# Patient Record
Sex: MALE | Race: WHITE | NOT HISPANIC OR LATINO | Employment: UNEMPLOYED | URBAN - NONMETROPOLITAN AREA
[De-identification: names, ages, dates, MRNs, and addresses within clinical notes are randomized per-mention and may not be internally consistent; named-entity substitution may affect disease eponyms.]

---

## 2017-02-09 ENCOUNTER — CLINICAL SUPPORT (OUTPATIENT)
Dept: AUDIOLOGY | Facility: CLINIC | Age: 47
End: 2017-02-09

## 2017-02-09 ENCOUNTER — OFFICE VISIT (OUTPATIENT)
Dept: OTOLARYNGOLOGY | Facility: CLINIC | Age: 47
End: 2017-02-09

## 2017-02-09 VITALS — TEMPERATURE: 98.9 F | HEIGHT: 60 IN | BODY MASS INDEX: 29.06 KG/M2 | WEIGHT: 148 LBS

## 2017-02-09 DIAGNOSIS — G51.0 PARTIAL FACIAL NERVE PALSY: ICD-10-CM

## 2017-02-09 DIAGNOSIS — H90.3 ASYMMETRIC SNHL (SENSORINEURAL HEARING LOSS): ICD-10-CM

## 2017-02-09 DIAGNOSIS — H90.3 SENSORINEURAL HEARING LOSS, ASYMMETRICAL: Primary | ICD-10-CM

## 2017-02-09 DIAGNOSIS — H83.2X1 BALANCE PROBLEM DUE TO VESTIBULAR DYSFUNCTION OF RIGHT EAR: Primary | ICD-10-CM

## 2017-02-09 PROCEDURE — 99243 OFF/OP CNSLTJ NEW/EST LOW 30: CPT | Performed by: OTOLARYNGOLOGY

## 2017-02-09 RX ORDER — CYCLOBENZAPRINE HCL 10 MG
10 TABLET ORAL 3 TIMES DAILY
COMMUNITY
Start: 2016-11-23

## 2017-02-09 RX ORDER — LEVOTHYROXINE SODIUM 0.03 MG/1
25 TABLET ORAL
COMMUNITY
Start: 2016-11-30 | End: 2017-11-26

## 2017-02-09 RX ORDER — MECLIZINE HYDROCHLORIDE 25 MG/1
25 TABLET ORAL 3 TIMES DAILY
COMMUNITY
Start: 2016-11-23

## 2017-02-09 RX ORDER — ATORVASTATIN CALCIUM 20 MG/1
20 TABLET, FILM COATED ORAL NIGHTLY
COMMUNITY
Start: 2016-10-10

## 2017-02-09 RX ORDER — TRAZODONE HYDROCHLORIDE 50 MG/1
TABLET ORAL
COMMUNITY
Start: 2016-10-24

## 2017-02-09 NOTE — PROGRESS NOTES
Subjective   Ubaldo Miranda is a 47 y.o. male.   This is a consultation from Dr. Vidales  History of Present Illness     Patient reports back in 2015 he was diagnosed with a right sided acoustic neuroma.  Was subsequently sent to [facility] for surgery and underwent a partial resection and apparently radiation afterwards which was completed in December 2015.  Was apparently having right sided facial weakness before the surgery and had residual weakness after the surgery.  Reportedly has had hearing loss all his life and has noticed hearing is decreased on the left, which is now his only hearing ear.  Has a hearing aid for that ear that is about 9 years old.  Has significant trouble with imbalance.  Has continuous tinnitus in his right ear following surgery.    The following portions of the patient's history were reviewed and updated as appropriate: allergies, current medications, past family history, past medical history, past social history, past surgical history and problem list.      Ubaldo Miranda reports that he has never smoked. He does not have any smokeless tobacco history on file. He reports that he does not drink alcohol.  Patient is not a tobacco user and has not been counseled for use of tobacco products    Family History   Problem Relation Age of Onset   • Cancer Father    • Heart disease Father          Current Outpatient Prescriptions:   •  atorvastatin (LIPITOR) 20 MG tablet, , Disp: , Rfl:   •  cyclobenzaprine (FLEXERIL) 10 MG tablet, Take 10 mg by mouth 3 (Three) Times a Day., Disp: , Rfl:   •  diltiazem XR (DILACOR XR) 120 MG 24 hr capsule, Take 120 mg by mouth Daily., Disp: , Rfl:   •  ergocalciferol (ERGOCALCIFEROL) 99345 UNITS capsule, Take 50,000 Units by mouth., Disp: , Rfl:   •  levothyroxine (SYNTHROID) 25 MCG tablet, Take 25 mcg by mouth., Disp: , Rfl:   •  lisinopril (PRINIVIL,ZESTRIL) 40 MG tablet, Take 40 mg by mouth Daily., Disp: , Rfl:   •  meclizine (ANTIVERT) 25 MG tablet, Take 25 mg  by mouth 3 (Three) Times a Day., Disp: , Rfl:   •  traZODone (DESYREL) 50 MG tablet, 1-2 tabs po qhs, Disp: , Rfl:   •  guaiFENesin (MUCINEX) 600 MG 12 hr tablet, Take 1,200 mg by mouth 2 (Two) Times a Day As Needed for cough., Disp: , Rfl:   •  levoFLOXacin (LEVAQUIN) 500 MG tablet, Take 500 mg by mouth Daily., Disp: , Rfl:       Past Medical History   Diagnosis Date   • Acute bronchitis    • Blepharitis      Anterior and Posterior      • Chalazion      RLL   • Cough    • Discharge from eye    • Essential hypertension      and nonnephrotic proteinuria      • Hearing loss    • Rosacea    • Short stature disorder    • Upper respiratory infection    • Vitamin D deficiency          Review of Systems   HENT: Positive for hearing loss.    Eyes: Positive for visual disturbance.   Neurological: Positive for headaches.   All other systems reviewed and are negative.          Objective   Physical Exam  General: Well-nourished male who is of short stature but otherwise well-developed.  Head normocephalic.  Has right-sided facial weakness.  Has intermittent periorbital spasm.  Speech shows inflection consistent with long-standing hearing loss as well as some misarticulation related to his facial paralysis.  Ears: External ears no deformity, canals no discharge, tympanic membranes intact clear and mobile bilaterally.  Nose: Nares show no discharge mass polyp or purulence.  Boggy mucosa is present.  No gross external deformity.  Septum:midline  Oral cavity: Lips and gums without lesions.  Tongue and floor of mouth without lesions.  Parotid and submandibular ducts unobstructed.  No mucosal lesions on the buccal mucosa or vestibule of the mouth.  Pharynx: No erythema exudate or mass  Neck: No lymphadenopathy.  No thyromegaly.  Trachea and larynx midline.  No masses in the parotid or submandibular glands.    Audiogram is obtained and reviewed and shows a dead ear on the right and a mild to severe sensorineural hearing loss on the  left.  Tympanograms are type A bilaterally.  Discrimination score of 60% on the left no response on the right      Assessment/Plan   Ubaldo was seen today for hearing loss.    Diagnoses and all orders for this visit:    Balance problem due to vestibular dysfunction of right ear  -     Ambulatory Referral to Sports Medicine    Asymmetric SNHL (sensorineural hearing loss)    Partial facial nerve palsy      Plan: Explained to the patient that his trouble with imbalance may be amenable to vestibular rehabilitation and as such I recommended a consultation with physical therapy for vestibular rehabilitation.  He should also use a walking stick when ambulating and make sure that his home as well lit so that he has visual input as well.  Explained that there is no amplification or other remedy for his hearing loss on the right but that his hearing loss on the left may be amenable to a stronger or new her hearing aid if he is able to get one.  He'll return after vestibular rehabilitation and proximally 2 months.    My thanks Dr. Vidales for this consultation.

## 2017-02-09 NOTE — PROGRESS NOTES
STANDARD AUDIOMETRIC EVALUATION      Name:  Ubaldo Miranda  :  1970  Age:  47 y.o.  Date of Evaluation:  2017      HISTORY    Reason for visit:  Ubaldo Miranda is seen today for a hearing evaluation at the request of Dr. Yazan Gomez.  Patient reports he had an acoustic neuroma in his right ear removed by surgery.  Consequently, he no longer has hearing in his right ear.  However, he hears ringing and buzzing in his right ear.  He has a history of Meniere's Disease.  He wears an ITC hearing aid in his left ear.       EVALUATION    See Audiogram    RESULTS    Otoscopic Evaluation:  Clear ear canals  bilaterally        Tympanometry:   Immittance Measures: 226 Hz        Right Ear: Middle ear function within normal limits        Left Ear: Middle ear function within normal limits    Test technique:  Standard Audiometry     Pure Tone Audiometry:   Patient responded to pure tones at 40-90 dB for 250-8000 Hz in right ear and at no response for 250-8000 Hz in right ear.      Speech Audiometry:        Right Ear:  no response to speech                 Speech Discrimination scores were not evaluated due to limits of audiometer        Left Ear:  Speech Reception Threshold (SRT) was obtained at 55 dBHL                 Speech Discrimination scores were 60% in quiet when words were presented at 85 dBHL    Reliability:   good    IMPRESSIONS:  1.  Tympanometry results are consistent with Middle ear function within normal limits in both ears   2.  Pure tone results are consistent with profound flat sensorineural hearing loss  for right ear, and mild to severe sloping sensorineural hearing loss  for left ear.        RECOMMENDATIONS:  Patient is seeing the Ear Nose and Throat physician immediately following this examination.  It was a pleasure seeing Ubaldo Miranda in Audiology today.  We would be happy to do further testing or discuss these test as necessary.          This document has been electronically signed by  Marie Rdz, MS MCCRACKEN-A on February 9, 2017 4:57 PM       Marie Rdz MS CCC-A  Licensed Audiologist

## 2017-02-20 ENCOUNTER — HOSPITAL ENCOUNTER (OUTPATIENT)
Dept: PHYSICAL THERAPY | Facility: HOSPITAL | Age: 47
Setting detail: THERAPIES SERIES
Discharge: HOME | End: 2017-02-20

## 2017-02-20 DIAGNOSIS — R26.89 BALANCE DISORDER: ICD-10-CM

## 2017-02-20 DIAGNOSIS — H83.2X1 LABYRINTHINE DYSFUNCTION OF RIGHT EAR: Primary | ICD-10-CM

## 2017-02-20 PROCEDURE — 97162 PT EVAL MOD COMPLEX 30 MIN: CPT | Performed by: PHYSICAL THERAPIST

## 2017-02-20 NOTE — PROGRESS NOTES
Outpatient Physical Therapy Vestibular Initial Evaluation  [facility]     Patient Name: Ubaldo Miranda  : 1970  MRN: [id number]  Today's Date: 2017      Visit Date: 2017  Attendance:  (authorization required)  Subjective Improvement: N/A  Next MD Appt: 17  Recert Date: 3/13/17    Therapy Diagnosis: Vestibular and balance dysfunction; s/p R acoustic neuroma resection 8-12-15    There is no problem list on file for this patient.       Past Medical History   Diagnosis Date   • Acute bronchitis    • Blepharitis      Anterior and Posterior      • Chalazion      RLL   • Cough    • Discharge from eye    • Essential hypertension      and nonnephrotic proteinuria      • Hearing loss    • Rosacea    • Short stature disorder    • Upper respiratory infection    • Vitamin D deficiency         Past Surgical History   Procedure Laterality Date   • Brain surgery       acoustif neuroma partially removed      Patient's medical and surgical history and medications were reviewed with the patient. I requested patient bring an updated medication list.    Visit Dx:     ICD-10-CM ICD-9-CM   1. Labyrinthine dysfunction of right ear H83.2X1 386.50   2. Balance disorder R26.89 781.99             Patient History       17 1100          History    Chief Complaint Balance Problems  -SS      Date Current Problem(s) Began --   > 1.5 years  -SS      Brief Description of Current Complaint Balance issues/unsteadiness worse after a R acoustic neuroma removal on 2015. He has experienced resultant ringing of the right ear and sensation of the room moving around since the surgery. He also experiences vertigo with position changes. He did not have physical therapy after surgery. Symptoms occur daily and are not worsening or improving. He has no falls since surgery, but he and his mother are very cautious about him not falling. Dr. Gomez recommended a cane, but he has not gotten one yet. Pt is a single male  without children. He lives with his mother in a single story house with 1 step to enter and no steps inside. His hobbies include aquarium and computer games. He has difficulty with stairs due to fatigue and imbalance.  -SS      Previous treatment for THIS PROBLEM Medication   antivert  -SS      Patient/Caregiver Goals Relief from dizziness  -SS      Current Tobacco Use none  -SS      Occupation/sports/leisure activities unemployed. Hobbies include aquarium and computer games  -SS      Patient seeing anyone else for problem(s)? No  -SS      How has patient tried to help current problem? medical management only  -SS      Fall Risk Assessment    Any falls in the past year: No  -SS      Does patient have a fear of falling Other (comment)   not stated fear, but cautious  -SS      Daily Activities    Primary Language English  -      Safety    Are you being hurt, hit, or frightened by anyone at home or in your life? No  -SS      Are you being neglected by a caregiver No  -SS        User Key  (r) = Recorded By, (t) = Taken By, (c) = Cosigned By    Initials Name Provider Type     Kenny Rdz, PT Physical Therapist                Vestibular Eval       02/20/17 1200          Vestibular Objective    General Observation Stands with somewhat narrow NBOS that widens with gait.  -SS      Fall History none  -SS      Use of Assistive Devices none  -SS      Symptom Behavior    Type of Dizziness Imbalance;World moves;Funny feeling in head  -SS      Frequency of Dizziness Several Times a Day  -SS      Duration of Dizziness Other   seconds with position changes  -SS      Aggravating Factors --   position changes for dizziness; room moves w/o aggravation  -SS      Relieving Factors No known relieving factors  -SS      Occulomotor Exam Fixation Present    Occular ROM Normal  -SS      Spontaneous Nystagmus Absent  -SS      Gaze-induced Nystagmus Absent  -SS      Smooth Pursuit Normal  -SS      Static    Static  Position;Surface;Duration;Standing Static Balance Comments  -SS      Position Feet together (Rhomberg)  -SS      Surface Hard  -SS      Duration 3 seconds eyes closed  -SS      Standing Static Balance Comments tandem L & R lead needs assist to prevent fall  -SS      Dynamic    Position Feet apart tandem right forward;Feet apart tandem left forward;Single leg right;Single leg left   SLS unable to perform B secondary to loss of balance  -SS        User Key  (r) = Recorded By, (t) = Taken By, (c) = Cosigned By    Initials Name Provider Type    BLANCA Rdz, LORNA Physical Therapist                  PT Neuro       02/20/17 1200          Subjective Comments    Subjective Comments see Therapy Patient History  -SS      Precautions and Contraindications    Precautions/Limitations fall precautions   dizziness.  -SS      Subjective Pain    Able to rate subjective pain? yes  -SS      Pre-Treatment Pain Level 0  -SS      Post-Treatment Pain Level 0  -SS      Home Living    Living Arrangements house  -SS      Home Accessibility no concerns  -SS      Stair Railings at Home none  -SS      Living Environment Comment Lives with mother. 1 step to enter  -SS      Cognition    Overall Cognitive Status WFL  -SS        User Key  (r) = Recorded By, (t) = Taken By, (c) = Cosigned By    Initials Name Provider Type    BLANCA Rdz, LORNA Physical Therapist                      Therapy Education       02/20/17 1400    Therapy Education    Given Other (comment)   Overview of planned therapy  -SS    Program New  -SS    How Provided Verbal  -SS    Provided to Patient;Caregiver  -SS    Level of Understanding Verbalized  -SS      User Key  (r) = Recorded By, (t) = Taken By, (c) = Cosigned By    Initials Name Provider Type    BLANCA Rdz PT Physical Therapist                  Exercises       02/20/17 1200          Subjective Comments    Subjective Comments see Therapy Patient History  -SS      Subjective Pain    Able  to rate subjective pain? yes  -SS      Pre-Treatment Pain Level 0  -SS      Post-Treatment Pain Level 0  -SS        User Key  (r) = Recorded By, (t) = Taken By, (c) = Cosigned By    Initials Name Provider Type     Kenny Rdz, PT Physical Therapist                            PT OP Goals       02/20/17 1444 02/20/17 1400       PT Short Term Goals    STG Date to Achieve  03/13/17  -     STG 1  Demonstate B SLS w/ eyes open of >= 10 seconds  -SS     STG 2  Demonstrate tandem stance, B lead, of >= 5 seconds each  -SS     STG 3  Rhomberg stance with eyes closed of >= 20 seconds w/o sensation of falling over  -SS     Long Term Goals    LTG Date to Achieve  04/03/17  -     LTG 1  Independent w/ HEP  -SS     LTG 2  Ambulate community distances in clinic including obstacles with minimal losses of balance.  -     LTG 3  Minimal sensation of dizziness during movement or position changes  -     LTG 4  Ascend and descend 1 flight of stairs with minimal fatigue and loss of balance  -     Time Calculation    PT Goal Re-Cert Due Date 03/13/17  -SS 03/13/17  -       User Key  (r) = Recorded By, (t) = Taken By, (c) = Cosigned By    Initials Name Provider Type     Kenny Rdz, PT Physical Therapist                PT Assessment/Plan       02/20/17 1400          PT Assessment    Functional Limitations Impaired gait;Decreased safety during functional activities  -      Impairments Balance;Endurance  -      Assessment Comments Chronic balance issues complicated by resection of acoutic neuroma on R. Needs habituation and compensation training. Deaf R ear and hard of hearing in L  -SS      Rehab Potential Fair   Barrier: chronicity and ablation of R acoustic nerve  -      Patient/caregiver participated in establishment of treatment plan and goals Yes  -SS      Patient would benefit from skilled therapy intervention Yes  -SS      PT Plan    PT Frequency 2x/week  -SS      Predicted Duration of Therapy  Intervention (days/wks) 6 weeks  -      Planned CPT's? PT EVAL: 42557;PT RE-EVAL: 07109;PT THER PROC EA 15 MIN: 43148;PT NEUROMUSC RE-EDUCATION EA 15 MIN: 11334  -      Physical Therapy Interventions (Optional Details) vestibular training  -      PT Plan Comments Habituation and compensation strategies  -        User Key  (r) = Recorded By, (t) = Taken By, (c) = Cosigned By    Initials Name Provider Type     Kenny Rdz, PT Physical Therapist                 Time Calculation:   Start Time: 1004  Stop Time: 1038  Time Calculation (min): 34 min     Therapy Charges for Today     Code Description Service Date Service Provider Modifiers Qty    03145432380 HC PT EVAL MOD COMPLEXITY 2 2/20/2017 Kenny Rdz, PT GP 1                    Kenny Rdz, PT  2/20/2017

## 2017-02-23 ENCOUNTER — TRANSCRIBE ORDERS (OUTPATIENT)
Dept: LAB | Facility: HOSPITAL | Age: 47
End: 2017-02-23

## 2017-02-23 DIAGNOSIS — R80.9 PROTEINURIA: Primary | ICD-10-CM

## 2017-02-28 ENCOUNTER — HOSPITAL ENCOUNTER (OUTPATIENT)
Dept: PHYSICAL THERAPY | Facility: HOSPITAL | Age: 47
Setting detail: THERAPIES SERIES
Discharge: HOME | End: 2017-02-28

## 2017-02-28 DIAGNOSIS — H83.2X1 LABYRINTHINE DYSFUNCTION OF RIGHT EAR: Primary | ICD-10-CM

## 2017-02-28 DIAGNOSIS — R26.89 BALANCE DISORDER: ICD-10-CM

## 2017-02-28 PROCEDURE — 97110 THERAPEUTIC EXERCISES: CPT

## 2017-03-01 ENCOUNTER — HOSPITAL ENCOUNTER (OUTPATIENT)
Dept: PHYSICAL THERAPY | Facility: HOSPITAL | Age: 47
Setting detail: THERAPIES SERIES
Discharge: HOME | End: 2017-03-01

## 2017-03-01 DIAGNOSIS — H83.2X1 LABYRINTHINE DYSFUNCTION OF RIGHT EAR: Primary | ICD-10-CM

## 2017-03-01 DIAGNOSIS — R26.89 BALANCE DISORDER: ICD-10-CM

## 2017-03-01 PROCEDURE — 97110 THERAPEUTIC EXERCISES: CPT

## 2017-03-01 NOTE — PROGRESS NOTES
Outpatient Physical Therapy Ortho Treatment Note  [facility]     Patient Name: Ubaldo Miranda  : 1970  MRN: [id number]  Today's Date: 3/1/2017      Visit Date: 2017   Subjective Improvement: 0%  MD visit: 17  Visit Number: 3/3  Total Approved:7 visits approved (24 units)  Recert Date: 3/13/17    Refer to paper chart for [facility] protocol.        Visit Dx:    ICD-10-CM ICD-9-CM   1. Labyrinthine dysfunction of right ear H83.2X1 386.50   2. Balance disorder R26.89 781.99       There is no problem list on file for this patient.       Past Medical History   Diagnosis Date   • Acute bronchitis    • Blepharitis      Anterior and Posterior      • Chalazion      RLL   • Cough    • Discharge from eye    • Essential hypertension      and nonnephrotic proteinuria      • Hearing loss    • Rosacea    • Short stature disorder    • Upper respiratory infection    • Vitamin D deficiency         Past Surgical History   Procedure Laterality Date   • Brain surgery       acoustif neuroma partially removed              PT Ortho       17 1014    Precautions and Contraindications    Contraindications Deaf R Ear  -KH    Posture/Observations    Posture/Observations Comments Hard Hearing/Deaf R Ear   -KH      User Key  (r) = Recorded By, (t) = Taken By, (c) = Cosigned By    Initials Name Provider Type    GABRIEL Zee PTA Physical Therapy Assistant                PT Neuro       17 1014          Subjective Comments    Subjective Comments Pt reports that he had some nasuea with the exercises yesterday but not to bad.  DOing well with exercises at home.  Still feels like his balance is abou the same.  Still gets dizzy bending over to tie and put on his shoes.   -GABRIEL      Subjective Pain    Able to rate subjective pain? yes  -GABRIEL      Pre-Treatment Pain Level 0  -KH      Post-Treatment Pain Level 0  -KH        User Key  (r) = Recorded By, (t) = Taken By, (c) = Cosigned By    Initials Name Provider  "Type    GABRIEL Zee PTA Physical Therapy Assistant                        PT Assessment/Plan       03/01/17 1014       PT Assessment    Assessment Comments Completed section 2 with minimal dizziness or nausea.  Did well with standing balance.  Starts to sway after 15 seconds but no dizziness  -     PT Plan    PT Frequency 2x/week  -GABRIEL     Predicted Duration of Therapy Intervention (days/wks) 6 weeks  -     PT Plan Comments Progress [place] exerciese as able.  -       User Key  (r) = Recorded By, (t) = Taken By, (c) = Cosigned By    Initials Name Provider Type    GABRIEL Zee PTA Physical Therapy Assistant                    Exercises       03/01/17 1014          Subjective Comments    Subjective Comments Pt reports that he had some nasuea with the exercises yesterday but not to bad.  DOing well with exercises at home.  Still feels like his balance is abou the same.  Still gets dizzy bending over to tie and put on his shoes.   -      Subjective Pain    Able to rate subjective pain? yes  -      Pre-Treatment Pain Level 0  -      Post-Treatment Pain Level 0  -      Exercise 1    Exercise Name 1 [place] section B sitting  -      Sets 1 1  -KH      Reps 1 10  -KH      Exercise 2    Exercise Name 2 [place] Section 3 with head movements up,down,turn sideways  -      Exercise 3    Exercise Name 3 seated cone taps with head rotation at 3 levels  -      Sets 3 1  -KH      Reps 3 10  -KH      Exercise 4    Exercise Name 4 seated cone lift up reach and take down  -      Sets 4 2  -KH      Reps 4 5  -KH      Exercise 5    Exercise Name 5 standing NBOS EO  -KH      Sets 5 2  -KH      Time (Seconds) 5 30\" holds  -      Exercise 6    Exercise Name 6 seated cone pass under legs and grab while leaning fwd  -      Sets 6 2  -KH      Reps 6 5  -KH        User Key  (r) = Recorded By, (t) = Taken By, (c) = Cosigned By    Initials Name Provider Type    GABRIEL Zee PTA Physical Therapy " Assistant                               PT OP Goals       03/01/17 1014       PT Short Term Goals    STG Date to Achieve 03/13/17  -     STG 1 Demonstate B SLS w/ eyes open of >= 10 seconds  -     STG 1 Progress Not Met  -     STG 2 Demonstrate tandem stance, B lead, of >= 5 seconds each  -     STG 2 Progress Not Met  -     STG 3 Rhomberg stance with eyes closed of >= 20 seconds w/o sensation of falling over  -     STG 3 Progress Not Met  -     Long Term Goals    LTG Date to Achieve 04/03/17  -     LTG 1 Independent w/ HEP  -     LTG 1 Progress Ongoing  -     LTG 2 Ambulate community distances in clinic including obstacles with minimal losses of balance.  -     LTG 2 Progress Not Met  -     LTG 3 Minimal sensation of dizziness during movement or position changes  -     LTG 3 Progress Not Met  -     LTG 4 Ascend and descend 1 flight of stairs with minimal fatigue and loss of balance  -     LTG 4 Progress Not Met  -     Time Calculation    PT Goal Re-Cert Due Date 03/13/17  -       User Key  (r) = Recorded By, (t) = Taken By, (c) = Cosigned By    Initials Name Provider Type    GABRIEL Zee PTA Physical Therapy Assistant                Therapy Education       03/01/17 1014          Therapy Education    Given HEP   section 3 standing with head movements  -      Program New  -      How Provided Verbal;Written  -      Level of Understanding Teach back education performed;Demonstrated  -        User Key  (r) = Recorded By, (t) = Taken By, (c) = Cosigned By    Initials Name Provider Type    GABRIEL Zee PTA Physical Therapy Assistant                Time Calculation:   Start Time: 1014  Stop Time: 1054  Time Calculation (min): 40 min  Total Timed Code Minutes- PT: 40 minute(s)    Therapy Charges for Today     Code Description Service Date Service Provider Modifiers Qty    65317039779 HC PT THER PROC EA 15 MIN 3/1/2017 Lizz Zee PTA GP 3                    Lizz Zee  PTA  3/1/2017

## 2017-03-07 ENCOUNTER — HOSPITAL ENCOUNTER (OUTPATIENT)
Dept: PHYSICAL THERAPY | Facility: HOSPITAL | Age: 47
Setting detail: THERAPIES SERIES
Discharge: HOME | End: 2017-03-07

## 2017-03-07 DIAGNOSIS — R26.89 BALANCE DISORDER: ICD-10-CM

## 2017-03-07 DIAGNOSIS — H83.2X1 LABYRINTHINE DYSFUNCTION OF RIGHT EAR: Primary | ICD-10-CM

## 2017-03-07 PROCEDURE — 97110 THERAPEUTIC EXERCISES: CPT

## 2017-03-07 NOTE — PROGRESS NOTES
Outpatient Physical Therapy Ortho Treatment Note  [facility]     Patient Name: Ubaldo Miranda  : 1970  MRN: [id number]  Today's Date: 3/7/2017      Visit Date: 2017     Subjective Improvement 0%  Visits 4/4  Visits Approved 7 visits, 24 units or until 3-  RTMD 2017  Recert Date 3-    Visit Dx:    ICD-10-CM ICD-9-CM   1. Labyrinthine dysfunction of right ear H83.2X1 386.50   2. Balance disorder R26.89 781.99       There is no problem list on file for this patient.       Past Medical History   Diagnosis Date   • Acute bronchitis    • Blepharitis      Anterior and Posterior      • Chalazion      RLL   • Cough    • Discharge from eye    • Essential hypertension      and nonnephrotic proteinuria      • Hearing loss    • Rosacea    • Short stature disorder    • Upper respiratory infection    • Vitamin D deficiency         Past Surgical History   Procedure Laterality Date   • Brain surgery       acoustif neuroma partially removed              PT Ortho       17 1500    Precautions and Contraindications    Precautions/Limitations fall precautions  -CP    Contraindications Deaf Right Ear  -CP    Subjective Pain    Able to rate subjective pain? yes  -CP    Pre-Treatment Pain Level 0  -CP    Post-Treatment Pain Level 0  -CP      User Key  (r) = Recorded By, (t) = Taken By, (c) = Cosigned By    Initials Name Provider Type    CP Jaqui Moran PTA Physical Therapy Assistant                            PT Assessment/Plan       17 1518       PT Assessment    Assessment Comments Increased dizziness was reported with up and down ramp in rehab gym and in seated position bending at waist to  an object  -CP     PT Plan    PT Frequency 2x/week  -CP     Predicted Duration of Therapy Intervention (days/wks) 4 weeks  -CP     PT Plan Comments cont with POC. Progressing patient as tolerated  -CP       User Key  (r) = Recorded By, (t) = Taken By, (c) = Cosigned By    Initials Name  Provider Type    CP Jaqui Moran PTA Physical Therapy Assistant                    Exercises       03/07/17 1500          Subjective Comments    Subjective Comments Patient reports doing exercises at home.  Patient states that dizziness increases with positional changes  -CP      Subjective Pain    Able to rate subjective pain? yes  -CP      Pre-Treatment Pain Level 0  -CP      Post-Treatment Pain Level 0  -CP      Exercise 1    Exercise Name 1 CawthorneCooksey  Group A  -CP      Cueing 1 --   very slight dizziness reported  -CP      Sets 1 1  -CP      Reps 1 10  -CP      Exercise 2    Exercise Name 2 CawthorneCooksey Groups #4  -CP      Cueing 2 --   increase dizziness reported  -CP      Sets 2 1  -CP      Reps 2 10  -CP      Exercise 3    Exercise Name 3 cawthornecooksey Group C 2,, 3. 5  -CP      Cueing 3 --   dizziness with #5  -CP      Sets 3 1  -CP      Reps 3 10  -CP      Exercise 4    Exercise Name 4 CawthorneCooksey Group D #2   increase dizziness reported  -CP      Cueing 4 --   in // bars with gait belt  -CP      Reps 4 5  -CP      Exercise 5    Exercise Name 5 retro gait in // bars with gait belt  -CP      Reps 5 5  -CP      Exercise 6    Exercise Name 6 up ramp with eyes closed down ramp with eyes open with giat belt and stand by assist  -CP      Cueing 6 Tactile  -CP      Reps 6 5  -CP        User Key  (r) = Recorded By, (t) = Taken By, (c) = Cosigned By    Initials Name Provider Type    CP Jaqui Moran PTA Physical Therapy Assistant                               PT OP Goals       03/07/17 1500       PT Short Term Goals    STG Date to Achieve 03/13/17  -CP     STG 1 Demonstate B SLS w/ eyes open of >= 10 seconds  -CP     STG 1 Progress Not Met  -CP     STG 2 Demonstrate tandem stance, B lead, of >= 5 seconds each  -CP     STG 2 Progress Not Met  -CP     STG 3 Rhomberg stance with eyes closed of >= 20 seconds w/o sensation of falling over  -CP     STG 3 Progress Not Met  -CP     Long Term  Goals    LTG Date to Achieve 04/03/17  -CP     LTG 1 Independent w/ HEP  -CP     LTG 1 Progress Ongoing  -CP     LTG 2 Ambulate community distances in clinic including obstacles with minimal losses of balance.  -CP     LTG 2 Progress Not Met  -CP     LTG 3 Minimal sensation of dizziness during movement or position changes  -CP     LTG 3 Progress Not Met  -CP     LTG 4 Ascend and descend 1 flight of stairs with minimal fatigue and loss of balance  -CP     LTG 4 Progress Not Met  -CP       User Key  (r) = Recorded By, (t) = Taken By, (c) = Cosigned By    Initials Name Provider Type    CP Jaqui Moran PTA Physical Therapy Assistant                Therapy Education       03/07/17 1517          Therapy Education    Given HEP   no change to HEP at this time  -CP      Program Reinforced  -CP      How Provided Verbal;Demonstration  -CP      Provided to Patient;Caregiver  -CP      Level of Understanding Verbalized;Demonstrated  -CP        User Key  (r) = Recorded By, (t) = Taken By, (c) = Cosigned By    Initials Name Provider Type    CP Jaqui Moran PTA Physical Therapy Assistant                Time Calculation:   Start Time: 1300  Stop Time: 1345  Time Calculation (min): 45 min  Total Timed Code Minutes- PT: 45 minute(s)    Therapy Charges for Today     Code Description Service Date Service Provider Modifiers Qty    04878228387 HC PT THER PROC EA 15 MIN 3/7/2017 Jaqui Moran PTA GP 3                    Jaqui Moran PTA  3/7/2017

## 2017-03-09 ENCOUNTER — HOSPITAL ENCOUNTER (OUTPATIENT)
Dept: PHYSICAL THERAPY | Facility: HOSPITAL | Age: 47
Setting detail: THERAPIES SERIES
Discharge: HOME | End: 2017-03-09

## 2017-03-09 DIAGNOSIS — R26.89 BALANCE DISORDER: ICD-10-CM

## 2017-03-09 DIAGNOSIS — H83.2X1 LABYRINTHINE DYSFUNCTION OF RIGHT EAR: Primary | ICD-10-CM

## 2017-03-09 PROCEDURE — 97110 THERAPEUTIC EXERCISES: CPT

## 2017-03-09 NOTE — PROGRESS NOTES
Outpatient Physical Therapy Ortho Treatment Note  [facility]     Patient Name: Ubaldo Miranda  : 1970  MRN: [id number]  Today's Date: 3/9/2017      Visit Date: 2017   Subjective Improvement: 0%  Visit Number:     Recert Date:   3/13/17  MD Visit:   4/3/17  Total Approved Visits:  Eval plus 6 until 3/24/17 (24 units)    Visit Dx:    ICD-10-CM ICD-9-CM   1. Labyrinthine dysfunction of right ear H83.2X1 386.50   2. Balance disorder R26.89 781.99       There is no problem list on file for this patient.       Past Medical History   Diagnosis Date   • Acute bronchitis    • Blepharitis      Anterior and Posterior      • Chalazion      RLL   • Cough    • Discharge from eye    • Essential hypertension      and nonnephrotic proteinuria      • Hearing loss    • Rosacea    • Short stature disorder    • Upper respiratory infection    • Vitamin D deficiency         Past Surgical History   Procedure Laterality Date   • Brain surgery       acoustif neuroma partially removed              PT Ortho       17 1300    Precautions and Contraindications    Precautions/Limitations fall precautions   used gait belt  -BB    Contraindications Deaf Right Ear  -BB    Gait Assessment/Treatment    Gait, Comment No AD. Unsteady gait at times.    Lat sway with gait.  -BB      17 1500    Precautions and Contraindications    Precautions/Limitations fall precautions  -CP    Contraindications Deaf Right Ear  -CP    Subjective Pain    Able to rate subjective pain? yes  -CP    Pre-Treatment Pain Level 0  -CP    Post-Treatment Pain Level 0  -CP      User Key  (r) = Recorded By, (t) = Taken By, (c) = Cosigned By    Initials Name Provider Type    BB Mary Bell PTA Physical Therapy Assistant    YADI Moran PTA Physical Therapy Assistant                            PT Assessment/Plan       17 1344       PT Assessment    Assessment Comments Good effort. No change to current HEP.  Pt still risk for falls  due to dizziness.  -BB     PT Plan    PT Frequency 2x/week  -BB     Predicted Duration of Therapy Intervention (days/wks) x 4 weeks  -BB     PT Plan Comments Continue with standing balance and habituation.  Recheck next Wednesday with primary PT.  -BB       User Key  (r) = Recorded By, (t) = Taken By, (c) = Cosigned By    Initials Name Provider Type    CARLA Bell PTA Physical Therapy Assistant                    Exercises       03/09/17 1300          Subjective Comments    Subjective Comments Feeling about the same. Had dizziness this morning when bent over to tie shoes.  -BB      Subjective Pain    Able to rate subjective pain? yes  -BB      Pre-Treatment Pain Level 0   no dizziness at beginning of treatment.  -BB      Post-Treatment Pain Level 4   dizziness not pain  -BB      Exercise 1    Exercise Name 1 Cawthorne/Cooksey Group A #2  A and B  -BB      Time (Seconds) 1 --   A 43 sec  B 40 sec  -BB      Exercise 2    Exercise Name 2 CawthorneCooksey Groups A #1 A  and B  -BB      Time (Seconds) 2 --   A 47 sec   B 32 Sec  -BB      Exercise 3    Exercise Name 3 Romberg SBA  -BB      Time (Minutes) 3 1  -BB      Exercise 4    Exercise Name 4 Split stance B CGA   performed Left in front first.  Needed rest in between.   -BB      Time (Seconds) 4 30   B  -BB      Exercise 5    Exercise Name 5 Feet apart EC    CGA/Min A at times.  -BB      Reps 5 2  -BB      Time (Seconds) 5 30  -BB      Exercise 6    Exercise Name 6 Gait 360 ft with cues    gait belt and 1 standing break to regroup  -BB      Cueing 6 Verbal  -BB      Exercise 7    Exercise Name 7 Standing Head turns   lateral  -BB      Time (Seconds) 7 30  -BB      Exercise 8    Exercise Name 8 Seated Eyes Closed head turns  -BB      Time (Seconds) 8 35 Sec   Shaky at end of exercise  -BB        User Key  (r) = Recorded By, (t) = Taken By, (c) = Cosigned By    Initials Name Provider Type    CARLA Bell PTA Physical Therapy Assistant                                PT OP Goals       03/09/17 1300       PT Short Term Goals    STG Date to Achieve 03/13/17  -BB     STG 1 Demonstate B SLS w/ eyes open of >= 10 seconds  -BB     STG 1 Progress Not Met  -BB     STG 2 Demonstrate tandem stance, B lead, of >= 5 seconds each  -BB     STG 2 Progress Not Met  -BB     STG 3 Rhomberg stance with eyes closed of >= 20 seconds w/o sensation of falling over  -BB     STG 3 Progress Not Met  -BB     Long Term Goals    LTG Date to Achieve 04/03/17  -BB     LTG 1 Independent w/ HEP  -BB     LTG 1 Progress Ongoing  -BB     LTG 2 Ambulate community distances in clinic including obstacles with minimal losses of balance.  -BB     LTG 2 Progress Not Met  -BB     LTG 3 Minimal sensation of dizziness during movement or position changes  -BB     LTG 3 Progress Not Met  -BB     LTG 4 Ascend and descend 1 flight of stairs with minimal fatigue and loss of balance  -BB     LTG 4 Progress Not Met  -BB     Time Calculation    PT Goal Re-Cert Due Date 03/13/17  -BB       User Key  (r) = Recorded By, (t) = Taken By, (c) = Cosigned By    Initials Name Provider Type    CARLA Bell PTA Physical Therapy Assistant                Therapy Education       03/09/17 1343          Therapy Education    Given HEP  -BB      Program Reinforced   no change to HEP  -BB      How Provided Verbal;Demonstration  -BB      Provided to Patient;Caregiver  -BB      Level of Understanding Verbalized;Demonstrated  -BB        User Key  (r) = Recorded By, (t) = Taken By, (c) = Cosigned By    Initials Name Provider Type    CARLA Bell PTA Physical Therapy Assistant                Time Calculation:   Start Time: 1304  Stop Time: 1345  Time Calculation (min): 41 min  Total Timed Code Minutes- PT: 41 minute(s)    Therapy Charges for Today     Code Description Service Date Service Provider Modifiers Qty    38496144934 HC PT THER PROC EA 15 MIN 3/9/2017 Mary Bell PTA GP 3                    Mary Bell  PTA  3/9/2017

## 2017-03-13 ENCOUNTER — HOSPITAL ENCOUNTER (OUTPATIENT)
Dept: PHYSICAL THERAPY | Facility: HOSPITAL | Age: 47
Setting detail: THERAPIES SERIES
Discharge: HOME | End: 2017-03-13

## 2017-03-13 DIAGNOSIS — H83.2X1 VESTIBULAR DYSFUNCTION, RIGHT: Primary | ICD-10-CM

## 2017-03-13 PROCEDURE — 97110 THERAPEUTIC EXERCISES: CPT | Performed by: PHYSICAL THERAPIST

## 2017-03-13 PROCEDURE — 97530 THERAPEUTIC ACTIVITIES: CPT | Performed by: PHYSICAL THERAPIST

## 2017-03-13 PROCEDURE — 97164 PT RE-EVAL EST PLAN CARE: CPT | Performed by: PHYSICAL THERAPIST

## 2017-03-13 NOTE — THERAPY DISCHARGE NOTE
Outpatient Physical Therapy Ortho Discharge  [facility]     Patient Name: Ubaldo Miranda  : 1970  MRN: [id number]  Today's Date: 3/13/2017      Visit Date: 2017     Visit 6 of 6  0% improvement                There is no problem list on file for this patient.       Past Medical History   Diagnosis Date   • Acute bronchitis    • Blepharitis      Anterior and Posterior      • Chalazion      RLL   • Cough    • Discharge from eye    • Essential hypertension      and nonnephrotic proteinuria      • Hearing loss    • Rosacea    • Short stature disorder    • Upper respiratory infection    • Vitamin D deficiency         Past Surgical History   Procedure Laterality Date   • Brain surgery       acoustif neuroma partially removed          Visit Dx:     ICD-10-CM ICD-9-CM   1. Vestibular dysfunction, right H83.2X1 386.50                                  Therapy Education       17 1355          Therapy Education    Given HEP;Fall prevention and home safety  -KW        User Key  (r) = Recorded By, (t) = Taken By, (c) = Cosigned By    Initials Name Provider Type    JASPER Roque, PT Physical Therapist                PT OP Goals       17 1300       PT Short Term Goals    STG Date to Achieve 17  -KW     STG 1 Demonstate B SLS w/ eyes open of >= 10 seconds  -KW     STG 1 Progress Not Met  -KW     STG 2 Demonstrate tandem stance, B lead, of >= 5 seconds each  -KW     STG 2 Progress Not Met  -KW     STG 3 Rhomberg stance with eyes closed of >= 20 seconds w/o sensation of falling over  -KW     STG 3 Progress Not Met  -KW     Long Term Goals    LTG Date to Achieve 17  -KW     LTG 1 Independent w/ HEP  -KW     LTG 1 Progress Ongoing  -KW     LTG 2 Ambulate community distances in clinic including obstacles with minimal losses of balance.  -KW     LTG 2 Progress Not Met  -KW     LTG 3 Minimal sensation of dizziness during movement or position changes  -KW     LTG 3 Progress Not Met  -KW      LTG 4 Ascend and descend 1 flight of stairs with minimal fatigue and loss of balance  -KW     LTG 4 Progress Not Met  -KW     Time Calculation    PT Goal Re-Cert Due Date 04/03/17  -KW       User Key  (r) = Recorded By, (t) = Taken By, (c) = Cosigned By    Initials Name Provider Type    JASPER Roque, PT Physical Therapist                PT Assessment/Plan       03/13/17 1356       PT Assessment    Assessment Comments no change in symptoms since start of PT. Good effort.  -KW     Please refer to paper survey for additional self-reported information No  -KW     Rehab Potential Fair  -KW     Patient/caregiver participated in establishment of treatment plan and goals Yes  -KW     PT Plan    PT Plan Comments Today last vist. D/C from PT  -KW       User Key  (r) = Recorded By, (t) = Taken By, (c) = Cosigned By    Initials Name Provider Type    JASPER Roque, PT Physical Therapist                Exercises       03/13/17 1300          Subjective Comments    Subjective Comments feeling no change since start of PT  -KW      Exercise 1    Exercise Name 1 Cawthorne/Cooksey Group A #2  A and B  -KW      Cueing 1 --   very slight dizziness reported  -KW      Sets 1 1  -KW      Reps 1 10  -KW      Time (Seconds) 1 --   A 43 sec  B 40 sec  -KW      Exercise 2    Exercise Name 2 CawthorneCooksey Groups A #1 A  and B  -KW      Cueing 2 --   increase dizziness reported  -KW      Sets 2 1  -KW      Reps 2 10  -KW      Time (Seconds) 2 --   A 47 sec   B 32 Sec  -KW      Exercise 3    Exercise Name 3 Romberg SBA  -KW      Cueing 3 --   dizziness with #5  -KW      Sets 3 1  -KW      Reps 3 10  -KW      Time (Minutes) 3 1  -KW      Exercise 4    Exercise Name 4 Split stance B CGA   performed Left in front first.  Needed rest in between.   -KW      Cueing 4 --   in // bars with gait belt  -KW      Sets 4 2  -KW      Reps 4 5  -KW      Time (Seconds) 4 30   B  -KW      Exercise 5    Exercise Name 5 Feet apart EC    CGA/Min A  at times.  -KW      Sets 5 2  -KW      Reps 5 2  -KW      Time (Seconds) 5 30  -KW      Exercise 6    Exercise Name 6 Gait 360 ft with cues    gait belt and 1 standing break to regroup  -KW      Cueing 6 Verbal  -KW      Sets 6 2  -KW      Reps 6 5  -KW      Exercise 7    Exercise Name 7 Standing Head turns   lateral  -KW      Time (Seconds) 7 30  -KW      Exercise 8    Exercise Name 8 Seated Eyes Closed head turns  -KW      Time (Seconds) 8 35 Sec   Shaky at end of exercise  -KW        User Key  (r) = Recorded By, (t) = Taken By, (c) = Cosigned By    Initials Name Provider Type    KW Gege Roque, PT Physical Therapist                             Time Calculation:   Start Time: 1300  Stop Time: 1350  Time Calculation (min): 50 min  Total Timed Code Minutes- PT: 50 minute(s)    Therapy Charges for Today     Code Description Service Date Service Provider Modifiers Qty    22731261911  PT RE-EVAL ESTABLISHED PLAN 2 3/13/2017 Gege Roque, PT GP 1    [id number]  PT THERAPEUTIC ACT EA 15 MIN 3/13/2017 Gege Roque, PT GP 2    96513254453 HC PT THER PROC EA 15 MIN 3/13/2017 Gege Roque, PT GP 1                OP PT Discharge Summary  Date of Discharge: 03/13/17  Reason for Discharge: Lack of progress  Outcomes Achieved: Unable to make functional progress toward goals at this time  Discharge Instructions: D/C from PT with HEP        Gege Roque, PT  3/13/2017

## 2017-03-15 ENCOUNTER — APPOINTMENT (OUTPATIENT)
Dept: PHYSICAL THERAPY | Facility: HOSPITAL | Age: 47
End: 2017-03-15

## 2017-04-13 ENCOUNTER — OFFICE VISIT (OUTPATIENT)
Dept: OTOLARYNGOLOGY | Facility: CLINIC | Age: 47
End: 2017-04-13

## 2017-04-13 VITALS — HEIGHT: 60 IN | TEMPERATURE: 97.4 F | WEIGHT: 148 LBS | BODY MASS INDEX: 29.06 KG/M2

## 2017-04-13 DIAGNOSIS — H83.2X1 BALANCE PROBLEM DUE TO VESTIBULAR DYSFUNCTION OF RIGHT EAR: Primary | ICD-10-CM

## 2017-04-13 PROCEDURE — 99213 OFFICE O/P EST LOW 20 MIN: CPT | Performed by: OTOLARYNGOLOGY

## 2017-04-13 NOTE — PROGRESS NOTES
Subjective   Ubaldo Miranda is a 47 y.o. male.       History of Present Illness   This patient has a history of partial resection of a right sided acoustic neuroma with subsequent radiation therapy.  Has chronic right-sided facial weakness and chronic imbalance.  Has lifelong hearing loss and wears a hearing aid in his left ear.  Was sent for vestibular rehabilitation which he participated in but states it did not seem to help his balance at all.  He's been unable to obtain a new hearing aid for his left ear due to financial reasons.  Still has imbalance when walking.      The following portions of the patient's history were reviewed and updated as appropriate: allergies, current medications, past family history, past medical history, past social history, past surgical history and problem list.     reports that he has never smoked. He does not have any smokeless tobacco history on file. He reports that he does not drink alcohol.   Patient is not a tobacco user and has not been counseled for use of tobacco products      Review of Systems   Constitutional: Negative for fever.           Objective   Physical Exam  Ears: External ears no deformity, canals no discharge, tympanic membranes intact clear and mobile bilaterally.      Assessment/Plan   Ubaldo was seen today for follow-up.    Diagnoses and all orders for this visit:    Balance problem due to vestibular dysfunction of right ear      Plan: Prescribed a straight Benson for use with ambulation as this is the only other thing I have to offer her sense he doesn't feel like vestibular rehabilitation was of any benefit.  Advised him to return in a year with another hearing test and call sooner for problems.

## 2017-06-08 DIAGNOSIS — H93.3X1 DISORDER OF RIGHT ACOUSTIC NERVE: Primary | ICD-10-CM

## 2018-11-07 ENCOUNTER — CLINICAL SUPPORT (OUTPATIENT)
Dept: AUDIOLOGY | Facility: CLINIC | Age: 48
End: 2018-11-07

## 2018-11-07 DIAGNOSIS — H90.3 SENSORINEURAL HEARING LOSS, ASYMMETRICAL: Primary | ICD-10-CM

## 2018-11-07 PROCEDURE — 92557 COMPREHENSIVE HEARING TEST: CPT | Performed by: AUDIOLOGIST

## 2018-11-07 PROCEDURE — 92567 TYMPANOMETRY: CPT | Performed by: AUDIOLOGIST

## 2018-11-07 NOTE — PROGRESS NOTES
HEARING AID EVALUATION WITH AUDIOGRAM    Name:  Ubaldo Miranda  :  1970  Age:  48 y.o.  Date of Evaluation:  2018      HISTORY    Reason for visit:  Ubaldo Miranda is seen today for a hearing test and hearing aid evaluation at the request of himself .  Patient reports he had surgery in  to remove an acoustic neuroma in his right ear.  He states he became profoundly deaf in his right ear after that.  He states he was born with a hearing loss bilaterally and has been wearing hearing aids since he was in 2nd grade.  With the deafness in his right ear now, he is pursuing amplification for his left ear.    Hearing aid history:  Patient currently does not have hearing aids.    EVALUATION    See Audiogram    RESULTS:    Otoscopy and Tympanometry 226 Hz :  Right Ear:  Otoscopy:  Clear ear canal          Tympanometry:  Middle ear function within normal limits    Left Ear:   Otoscopy:  Clear ear canal        Tympanometry:  Middle ear function within normal limits    Test technique:  Standard Audiometry     Pure Tone Audiometry:   Pure tones were not tested in right ear.  Patient responded to pure tones at 40-85 dB for 250-8000 Hz in left ear.      Speech Audiometry:        Right Ear: not tested      Left Ear:  Speech Reception Threshold (SRT) was obtained at 55 dBHL                 Speech Discrimination scores were 52% in quiet when words were presented at 90 dBHL    Reliability:   good    IMPRESSIONS:  1.  Tympanometry results are consistent with Middle ear function within normal limits in both ears.  2.  Pure tone results are consistent with mild to severe sloping sensorineural hearing loss  for left ear.      RECOMMENDATIONS:  Test results were reviewed with patient, and all questions were answered at this time. Amplification options were discussed.  During the Hearing Aid discussion, Mr. Miranda has chosen 1 CROS BTE hearing aid(s) for right ear and 1 BTE with custom ear mold for left ear.  The hearing aid  recommended is from [employer] with the Bolero B70 P for left ear and CROS II BTE for right ear.  The cost of this hearing aid is $$2,300.00 per aid, $75.00 for the custom ear mold and $1,500.00 for the CROS for a total of $3,875.  This amount will be due at the time of fitting.      An impression was made of his left ear at this time.  Hearing aids and ear mold will be ordered.  Mr. Miranda will be contacted when everything arrives to make an appointment for his fitting.      It was a pleasure seeing Ubaldo Miranda in Audiology today.  I look forward to helping Mr. Miranda with his amplification needs.            This document has been electronically signed by Marie Rdz MS CCC-REINALDO on November 7, 2018 5:30 PM       Marie Rdz MS CCC-A  Licensed Audiologist

## 2018-12-21 ENCOUNTER — CLINICAL SUPPORT (OUTPATIENT)
Dept: AUDIOLOGY | Facility: CLINIC | Age: 48
End: 2018-12-21

## 2018-12-21 DIAGNOSIS — Z46.1 ENCOUNTER FOR FITTING OR ADJUSTMENT OF HEARING AID: Primary | ICD-10-CM

## 2018-12-21 PROCEDURE — HEARINGNOCHG: Performed by: AUDIOLOGIST

## 2018-12-21 NOTE — PROGRESS NOTES
HEARING AID FITTING    Name:  Ubaldo Miranda  :  1970  Age:  48 y.o.  Date of Evaluation:  2018      HISTORY    Reason for visit:  Ubaldo Miranda is seen today for a hearing aid fitting.  The hearing aids to be fit are Behind the Ear (BTE) with custom ear mold hearing aids from [employer] with the Bolero B70 SP digital circuit for the left ear, and a CROS B 13 BTE hearing aid with slim fit tubing for the right ear.    Right Hearing Aid Serial Number: [id number]  Left Hearing Aid Serial Number: [id number]      Warranty Expiration:  's warranty extends through 2021 for the Bolero B70 SP and 2020 for the CROS B which covers repairs, loss and damage.    Battery Size:  13    The hearing aids were fit to Mr. Miranda's ears.  Patient reported the fit was comfortable and the sound was good.  Patient was instructed on use and care of the hearing instruments.  The necessary paperwork was signed.  All questions were answered at this time.  The cost of this hearing aid is $2,300.00 per aid, $1,500 for the CROS, and $75.00 for the left ear mold for a total of $3,875.00.  This amount was collected In [employer] at this time.      Mr. Miranda will return in 2 weeks for a hearing aid follow up.  At that time we will make adjustments to the hearing aid(s) and address problems as necessary.      It was a pleasure seeing Ubaldo Miranda in Audiology today.  It is a pleasure helping Mr. Miranda with his amplification needs.             This document has been electronically signed by Marie Rdz MS CCC-A on 2018 3:43 PM        Marie Rdz MS CCC-A  Licensed Audiologist      For Billing and Coding:    Hearing Aid, Digital Binaural BTE - no charge

## 2019-01-02 ENCOUNTER — CLINICAL SUPPORT (OUTPATIENT)
Dept: AUDIOLOGY | Facility: CLINIC | Age: 49
End: 2019-01-02

## 2019-01-02 DIAGNOSIS — Z46.1 ENCOUNTER FOR FITTING OR ADJUSTMENT OF HEARING AID: Primary | ICD-10-CM

## 2019-01-02 PROCEDURE — HEARINGNOCHG: Performed by: AUDIOLOGIST

## 2019-01-02 NOTE — PROGRESS NOTES
HEARING AID CHECK    Name:  Ubaldo Miranda  :  1970  Age:  48 y.o.  Date of Evaluation:  2019      HISTORY    Reason for visit:  Ubaldo Miranda is seen today for a hearing aid follow up.  Patient reports he is doing well with his new hearing aids, and he is hearing things a lot better.  He states he wears them just about every day.    Hearing aid history:  Patient is currently wearing a Behind the Ear (BTE) with custom ear mold hearing aid in left ear(s). and CROS BTE hearing aid in right ear.    OFFICE VISIT    During today's visit he states he likes the sound the way it is, so no adjustments were needed at this time.  It is recommended he return for tubing changes and new ear domes as needed.  He will return for hearing aid assistance as needed.    It was a pleasure seeing Ubaldo Miranda in Audiology today.  It is a pleasure helping Mr. Miranda with his amplification needs.             This document has been electronically signed by Marie Rdz MS CCC-A on 2019 4:28 PM       Marie Rdz MS CCC-A  Licensed Audiologist    For Billing and Codin  Hearing Aid Check, Binaural - no charge

## 2019-07-03 ENCOUNTER — ANESTHESIA (OUTPATIENT)
Dept: GASTROENTEROLOGY | Facility: HOSPITAL | Age: 49
End: 2019-07-03

## 2019-07-03 ENCOUNTER — ANESTHESIA EVENT (OUTPATIENT)
Dept: GASTROENTEROLOGY | Facility: HOSPITAL | Age: 49
End: 2019-07-03

## 2019-07-03 ENCOUNTER — HOSPITAL ENCOUNTER (OUTPATIENT)
Facility: HOSPITAL | Age: 49
Setting detail: HOSPITAL OUTPATIENT SURGERY
Discharge: HOME | End: 2019-07-03
Attending: INTERNAL MEDICINE | Admitting: INTERNAL MEDICINE

## 2019-07-03 VITALS
HEART RATE: 82 BPM | BODY MASS INDEX: 29.52 KG/M2 | RESPIRATION RATE: 18 BRPM | SYSTOLIC BLOOD PRESSURE: 119 MMHG | HEIGHT: 60 IN | DIASTOLIC BLOOD PRESSURE: 81 MMHG | TEMPERATURE: 97.7 F | WEIGHT: 150.38 LBS | OXYGEN SATURATION: 95 %

## 2019-07-03 DIAGNOSIS — Z12.11 COLON CANCER SCREENING: ICD-10-CM

## 2019-07-03 PROCEDURE — 88305 TISSUE EXAM BY PATHOLOGIST: CPT | Performed by: INTERNAL MEDICINE

## 2019-07-03 PROCEDURE — 25010000002 PROPOFOL 10 MG/ML EMULSION: Performed by: NURSE ANESTHETIST, CERTIFIED REGISTERED

## 2019-07-03 PROCEDURE — 88305 TISSUE EXAM BY PATHOLOGIST: CPT | Performed by: PATHOLOGY

## 2019-07-03 RX ORDER — GABAPENTIN 400 MG/1
400 CAPSULE ORAL 3 TIMES DAILY
COMMUNITY

## 2019-07-03 RX ORDER — CETIRIZINE HYDROCHLORIDE 10 MG/1
10 TABLET ORAL DAILY
COMMUNITY

## 2019-07-03 RX ORDER — PROPOFOL 10 MG/ML
VIAL (ML) INTRAVENOUS AS NEEDED
Status: DISCONTINUED | OUTPATIENT
Start: 2019-07-03 | End: 2019-07-03 | Stop reason: SURG

## 2019-07-03 RX ORDER — OMEPRAZOLE 40 MG/1
40 CAPSULE, DELAYED RELEASE ORAL DAILY
COMMUNITY

## 2019-07-03 RX ORDER — DEXTROSE AND SODIUM CHLORIDE 5; .45 G/100ML; G/100ML
30 INJECTION, SOLUTION INTRAVENOUS CONTINUOUS PRN
Status: DISCONTINUED | OUTPATIENT
Start: 2019-07-03 | End: 2019-07-03 | Stop reason: HOSPADM

## 2019-07-03 RX ADMIN — PROPOFOL 30 MG: 10 INJECTION, EMULSION INTRAVENOUS at 13:26

## 2019-07-03 RX ADMIN — DEXTROSE AND SODIUM CHLORIDE 30 ML/HR: 5; 450 INJECTION, SOLUTION INTRAVENOUS at 12:34

## 2019-07-03 RX ADMIN — PROPOFOL 70 MG: 10 INJECTION, EMULSION INTRAVENOUS at 13:21

## 2019-07-03 RX ADMIN — PROPOFOL 30 MG: 10 INJECTION, EMULSION INTRAVENOUS at 13:24

## 2019-07-03 NOTE — H&P
Karley Mosqueda DO,[facility]  Gastroenterology  Hepatology  Endoscopy  Board Certified in Internal Medicine and gastroenterology  [address], [address]  [place]. [address]  - [phone]   F - [phone]     GASTROENTEROLOGY HISTORY AND PHYSICAL  NOTE   KARLEY MOSQUEDA DO.         SUBJECTIVE:   7/3/2019    Name: Ubaldo Miranda  DOD: 1970        Chief Complaint:     Subjective : Screening for colon cancer    Patient is 49 y.o. male presents with desire for elective colonoscopy.      ROS/HISTORY/ CURRENT MEDICATIONS/OBJECTIVE/VS/PE:   Review of Systems:  All systems unremarkable unless specified below.  Constitutional   HENT  Eyes   Respiratory    Cardiovascular  Gastrointestinal   Endocrine  Genitourinary    Musculoskeletal   Skin  Allergic/Immunologic    Neurological    Hematological  Psychiatric/Behavioral    History:     Past Medical History:   Diagnosis Date   • Acute bronchitis    • Blepharitis     Anterior and Posterior      • Chalazion     RLL   • Cough    • Discharge from eye    • Essential hypertension     and nonnephrotic proteinuria      • Hearing loss    • Rosacea    • Short stature disorder    • Upper respiratory infection    • Vitamin D deficiency      Past Surgical History:   Procedure Laterality Date   • BRAIN SURGERY      acoustif neuroma partially removed      Family History   Problem Relation Age of Onset   • Cancer Father    • Heart disease Father      Social History     Tobacco Use   • Smoking status: Never Smoker   Substance Use Topics   • Alcohol use: No   • Drug use: Not on file     Medications Prior to Admission   Medication Sig Dispense Refill Last Dose   • cetirizine (zyrTEC) 10 MG tablet Take 10 mg by mouth Daily.   7/1/2019   • gabapentin (NEURONTIN) 400 MG capsule Take 400 mg by mouth 3 (Three) Times a Day.   7/1/2019   • lisinopril (PRINIVIL,ZESTRIL) 40 MG tablet Take 40 mg by mouth Daily.   7/2/2019 at Unknown time   • meclizine (ANTIVERT) 25 MG tablet Take  25 mg by mouth 3 (Three) Times a Day.   7/2/2019 at Unknown time   • omeprazole (priLOSEC) 40 MG capsule Take 40 mg by mouth Daily.   7/1/2019   • traZODone (DESYREL) 50 MG tablet 1-2 tabs po qhs   7/2/2019 at Unknown time   • atorvastatin (LIPITOR) 20 MG tablet Take 20 mg by mouth Every Night.   7/1/2019   • cyclobenzaprine (FLEXERIL) 10 MG tablet Take 10 mg by mouth 3 (Three) Times a Day.   7/1/2019   • diltiazem XR (DILACOR XR) 120 MG 24 hr capsule Take 120 mg by mouth Daily.   7/1/2019   • ergocalciferol (ERGOCALCIFEROL) 27723 UNITS capsule Take 50,000 Units by mouth Every 14 (Fourteen) Days.   6/26/2019   • guaiFENesin (MUCINEX) 600 MG 12 hr tablet Take 1,200 mg by mouth 2 (Two) Times a Day As Needed for cough.   Taking   • levoFLOXacin (LEVAQUIN) 500 MG tablet Take 500 mg by mouth Daily.   Taking     Allergies:  Patient has no known allergies.    I have reviewed the patients medical history, surgical history and family history in the available medical record system.     Current Medications:     Current Facility-Administered Medications   Medication Dose Route Frequency Provider Last Rate Last Dose   • dextrose 5 % and sodium chloride 0.45 % infusion  30 mL/hr Intravenous Continuous PRN Tremayne Garcia DO 30 mL/hr at 07/03/19 1234 30 mL/hr at 07/03/19 1234       Objective     Physical Exam:   Temp:  [97.4 °F (36.3 °C)] 97.4 °F (36.3 °C)  Heart Rate:  [110] 110  Resp:  [18] 18  BP: (187)/(98) 187/98    Physical Exam:  General Appearance:    Alert, cooperative, in no acute distress   Head:    Normocephalic, without obvious abnormality, atraumatic   Eyes:            Lids and lashes normal, conjunctivae and sclerae normal, no   icterus, no pallor, corneas clear, PERRLA   Ears:    Ears appear intact with no abnormalities noted   Throat:   No oral lesions, no thrush, oral mucosa moist   Neck:   No adenopathy, supple, trachea midline, no thyromegaly, no     carotid bruit, no JVD   Back:     No kyphosis present, no  scoliosis present, no skin lesions,       erythema or scars, no tenderness to percussion or                   palpation,   range of motion normal   Lungs:     Clear to auscultation,respirations regular, even and                   unlabored    Heart:    Regular rhythm and normal rate, normal S1 and S2, no            murmur, no gallop, no rub, no click   Breast Exam:    Deferred   Abdomen:     Normal bowel sounds, no masses, no organomegaly, soft        non-tender, non-distended, no guarding, no rebound                 tenderness   Genitalia:    Deferred   Extremities:   Moves all extremities well, no edema, no cyanosis, no              redness   Pulses:   Pulses palpable and equal bilaterally   Skin:   No bleeding, bruising or rash   Lymph nodes:   No palpable adenopathy   Neurologic:   Cranial nerves 2 - 12 grossly intact, sensation intact, DTR        present and equal bilaterally      Results Review:     Lab Results   Component Value Date    WBC 7.9 05/06/2019    WBC 8.3 12/28/2015    WBC 8.3 10/02/2014    HGB 15.2 05/06/2019    HGB 15.4 12/28/2015    HGB 14.6 10/02/2014    HCT 43.5 05/06/2019    HCT 42.8 12/28/2015    HCT 41.2 10/02/2014     05/06/2019     12/28/2015     10/02/2014             No results found for: LIPASE  Lab Results   Component Value Date    INR 0.9 08/08/2016          Radiology Review:  Imaging Results (last 72 hours)     ** No results found for the last 72 hours. **           I reviewed the patient's new clinical results.  I reviewed the patient's new imaging results and agree with the interpretation.     ASSESSMENT/PLAN:   ASSESSMENT:  1.  Screen for colon cancer    PLAN:  1.  Colonoscopy    Risk and benefits associated with the procedure are reviewed with the patient.  The patient wished to proceed     Tremayne Garcia DO  07/03/19  1:09 PM

## 2019-07-03 NOTE — ANESTHESIA POSTPROCEDURE EVALUATION
Patient: Ubaldo Miranda    Procedure Summary     Date:  07/03/19 Room / Location:  [facility] ENDOSCOPY 2 / [facility] ENDOSCOPY    Anesthesia Start:  1315 Anesthesia Stop:  1340    Procedure:  COLONOSCOPY (N/A ) Diagnosis:       Colon cancer screening      Colon polyp      Diverticulosis large intestine w/o perforation or abscess w/o bleeding      (Colon cancer screening [Z12.11])    Surgeon:  Tremayne Garcia DO Provider:  Jarek Juares CRNA    Anesthesia Type:  MAC ASA Status:  2          Anesthesia Type: MAC  Last vitals  BP   (!) 187/98 (07/03/19 1227)   Temp   97.4 °F (36.3 °C) (07/03/19 1227)   Pulse   110 (07/03/19 1227)   Resp   18 (07/03/19 1227)     SpO2   98 % (07/03/19 1227)     Post Anesthesia Care and Evaluation    Patient location during evaluation: bedside  Patient participation: complete - patient participated  Level of consciousness: awake and alert  Pain management: adequate  Airway patency: patent  Anesthetic complications: No anesthetic complications  PONV Status: none  Cardiovascular status: acceptable  Respiratory status: acceptable  Hydration status: acceptable

## 2019-07-03 NOTE — ANESTHESIA PREPROCEDURE EVALUATION
Anesthesia Evaluation     NPO Solid Status: > 8 hours  NPO Liquid Status: > 2 hours           Airway   Mallampati: III  TM distance: >3 FB  Small opening and Possible difficult intubation  Dental - normal exam     Pulmonary - normal exam   Cardiovascular - normal exam    (+) hypertension,       Neuro/Psych  GI/Hepatic/Renal/Endo      Musculoskeletal     Abdominal    Substance History      OB/GYN          Other                        Anesthesia Plan    ASA 2     MAC     intravenous induction   Anesthetic plan, all risks, benefits, and alternatives have been provided, discussed and informed consent has been obtained with: patient.

## 2019-07-05 LAB
LAB AP CASE REPORT: NORMAL
PATH REPORT.FINAL DX SPEC: NORMAL
PATH REPORT.GROSS SPEC: NORMAL

## 2021-02-04 ENCOUNTER — CLINICAL SUPPORT (OUTPATIENT)
Dept: AUDIOLOGY | Facility: CLINIC | Age: 51
End: 2021-02-04

## 2021-02-04 DIAGNOSIS — Z46.1 ENCOUNTER FOR FITTING OR ADJUSTMENT OF HEARING AID: Primary | ICD-10-CM

## 2021-02-04 PROCEDURE — HEARINGNOCHG: Performed by: AUDIOLOGIST

## 2021-02-04 NOTE — PROGRESS NOTES
HEARING AID CHECK    Name:  Ubaldo Miranda  :  1970  Age:  51 y.o.  Date of Evaluation:  2021      HISTORY    Reason for visit:  Ubaldo Miranda is seen today for a hearing aid warranty check.  Patient reports he needs his tubing changes on his left hearing aid.  He states he is doing well with his hearing aids.     Hearing aid history:  Patient is currently wearing a Behind the Ear (BTE) with custom ear mold hearing aid in left ear(s). and a CROS BTE with slim tubing in his right ear.     OFFICE VISIT    During today's visit tubing was changed on his left ear mold, and he reported the fit was good.  The ear dome on his right CROS aid was changed using a medium open dome.  He will return in 6 months for another tubing change, or sooner if he is having problems.     His warranty options were discussed.  At this time, he decided to purchase the extended warranty for both hearing aids.  Therefore, the warranties for his right CROS (SN# [id number]) and his left BTE hearing aid (SN# [id number]) were extended to 2022.  His $380.00 was paid at this time for the warranties.     It was a pleasure seeing Ubaldo Miranda in Audiology today.  It is a pleasure helping Mr. Miranda with his amplification needs.             This document has been electronically signed by Marie Rdz MS CCC-A on 2021 16:36 [facility]       Marie Rdz MS CCC-A  Licensed Audiologist    For Billing and Codin  Hearing Aid Check, Binaural - no charge

## 2021-08-02 ENCOUNTER — CLINICAL SUPPORT (OUTPATIENT)
Dept: AUDIOLOGY | Facility: CLINIC | Age: 51
End: 2021-08-02

## 2021-08-02 DIAGNOSIS — Z46.1 ENCOUNTER FOR FITTING OR ADJUSTMENT OF HEARING AID: Primary | ICD-10-CM

## 2021-08-02 PROCEDURE — HEARINGNOCHG: Performed by: AUDIOLOGIST

## 2021-08-02 NOTE — PROGRESS NOTES
HEARING AID CHECK    Name:  Ubaldo Miranda  :  1970  Age:  51 y.o.  Date of Evaluation:  2021      HISTORY    Reason for visit:  Ubaldo Miranda is seen today for a hearing aid check.  Patient reports he is due for his 6 month tubing change.  He states he is otherwise not having any problems with his hearing aids.    Hearing aid history:  Patient is currently wearing a Behind the Ear (BTE) with custom ear mold hearing aid in left ear ear(s). and CROS BTE in right ear.    OFFICE VISIT    During today's visit tubing was changed on his left ear mold.  He reported the fit was comfortable and the sound was good.  The slim tip on his CROS BTE was changed using a medium open dome.  He will return in 6 months for another tubing change, or sooner if he needs further assistance.       It was a pleasure seeing Ubaldo Miranda in Audiology today.  It is a pleasure helping Mr. Miranda with his amplification needs.             This document has been electronically signed by Marie Rdz MS CCC-A on 2021 15:44 CDT       Marie Rdz MS CCC-A  Licensed Audiologist    For Billing and Codin  Hearing Aid Check, Binaural - no charge

## 2022-04-11 ENCOUNTER — CLINICAL SUPPORT (OUTPATIENT)
Dept: AUDIOLOGY | Facility: CLINIC | Age: 52
End: 2022-04-11

## 2022-04-11 DIAGNOSIS — Z46.1 ENCOUNTER FOR FITTING OR ADJUSTMENT OF HEARING AID: Primary | ICD-10-CM

## 2022-04-11 PROCEDURE — HEARINGNOCHG: Performed by: AUDIOLOGIST

## 2022-04-13 NOTE — PROGRESS NOTES
HEARING AID CHECK    Name:  Ubaldo Miranda  :  1970  Age:  52 y.o.  Date of Evaluation:  2022      HISTORY    Reason for visit:  Ubaldo Miranda is seen today for a hearing aid problem.  Patient reports his left hearing aid isn't working.    Hearing aid history:  Patient is currently wearing a Behind the Ear (BTE) with custom ear mold hearing aid in left ear ear(s). and CROS BTE with slim tubing in right ear.    OFFICE VISIT    During today's visit tubing was changed on left ear mold.  Listening check revealed hearing aid is in good working condition.  He will return for another tubing change in 6 months, or sooner if he needs further assistance.     It was a pleasure seeing Ubaldo Miranda in Audiology today.  It is a pleasure helping Mr. Miranda with his amplification needs.             This document has been electronically signed by Marie Rdz MS CCC-A on 2022 09:21 CDT       Marie Rdz MS CCC-A  Licensed Audiologist    For Billing and Codin  Hearing Aid Check, Monaural - no charge

## 2022-11-08 ENCOUNTER — CLINICAL SUPPORT (OUTPATIENT)
Dept: AUDIOLOGY | Facility: CLINIC | Age: 52
End: 2022-11-08

## 2022-11-08 DIAGNOSIS — Z46.1 ENCOUNTER FOR FITTING OR ADJUSTMENT OF HEARING AID: Primary | ICD-10-CM

## 2022-11-08 PROCEDURE — HEARINGNOCHG: Performed by: AUDIOLOGIST

## 2022-11-10 NOTE — PROGRESS NOTES
HEARING AID CHECK    Name:  Ubaldo Miranda  :  1970  Age:  52 y.o.  Date of Evaluation:  11/10/2022      HISTORY    Reason for visit:  Ubaldo Miranda is seen today for a 6 month hearing aid check.  Patient reports he is due for a tubing change on his left hearing aid.  He asked if the volume could be turned up in the hearing aid as well.    Hearing aid history:  Patient is currently wearing a Behind the Ear (BTE) with custom ear mold hearing aid in left ear ear(s). and CROS BTE with slim tube in right ear.    OFFICE VISIT    During today's visit tubing was changed on his left ear mold.  Right CROS aid was cleaned and checked, and ear dome was changed using a medium open dome.  Computer was used to increase master gain in the hearing aid.  He reported the fit was comfortable and the sound was good.  A Level I charge of $25.00 was paid at this time.      He will return in 6 months for another tubing change, or sooner if he needs further assistance.      It was a pleasure seeing Ubaldo Miranda in Audiology today.  It is a pleasure helping Mr. Miranda with his amplification needs.             This document has been electronically signed by Marie Rdz MS CCC-A on November 10, 2022 11:34 CST       Marie Rdz MS CCC-A  Licensed Audiologist    For Billing and Codin  Hearing Aid Check, Binaural - no charge

## 2023-05-09 ENCOUNTER — CLINICAL SUPPORT (OUTPATIENT)
Dept: AUDIOLOGY | Facility: CLINIC | Age: 53
End: 2023-05-09
Payer: MEDICARE

## 2023-05-09 DIAGNOSIS — Z46.1 ENCOUNTER FOR FITTING OR ADJUSTMENT OF HEARING AID: Primary | ICD-10-CM

## (undated) DEVICE — CANN SMPL SOFTECH BIFLO ETCO2 A/M 7FT

## (undated) DEVICE — SINGLE-USE BIOPSY FORCEPS: Brand: RADIAL JAW 4